# Patient Record
Sex: FEMALE | Race: WHITE | Employment: FULL TIME | URBAN - METROPOLITAN AREA
[De-identification: names, ages, dates, MRNs, and addresses within clinical notes are randomized per-mention and may not be internally consistent; named-entity substitution may affect disease eponyms.]

---

## 2020-02-05 ENCOUNTER — OFFICE VISIT (OUTPATIENT)
Dept: URGENT CARE | Facility: URGENT CARE | Age: 40
End: 2020-02-05
Payer: COMMERCIAL

## 2020-02-05 ENCOUNTER — ANCILLARY PROCEDURE (OUTPATIENT)
Dept: GENERAL RADIOLOGY | Facility: CLINIC | Age: 40
End: 2020-02-05
Attending: PHYSICIAN ASSISTANT
Payer: COMMERCIAL

## 2020-02-05 VITALS
SYSTOLIC BLOOD PRESSURE: 104 MMHG | WEIGHT: 138 LBS | HEART RATE: 63 BPM | DIASTOLIC BLOOD PRESSURE: 62 MMHG | OXYGEN SATURATION: 99 % | TEMPERATURE: 97.2 F

## 2020-02-05 DIAGNOSIS — M79.645 PAIN OF FINGER OF LEFT HAND: ICD-10-CM

## 2020-02-05 DIAGNOSIS — S60.10XA SUBUNGUAL HEMATOMA OF FINGER OF LEFT HAND, INITIAL ENCOUNTER: ICD-10-CM

## 2020-02-05 DIAGNOSIS — M79.645 PAIN OF FINGER OF LEFT HAND: Primary | ICD-10-CM

## 2020-02-05 PROCEDURE — 99204 OFFICE O/P NEW MOD 45 MIN: CPT | Performed by: PHYSICIAN ASSISTANT

## 2020-02-05 PROCEDURE — 73140 X-RAY EXAM OF FINGER(S): CPT | Mod: LT

## 2020-02-05 RX ORDER — LEVOTHYROXINE SODIUM 75 UG/1
75 TABLET ORAL
COMMUNITY
Start: 2018-12-11

## 2020-02-05 RX ORDER — HYDROCODONE BITARTRATE AND ACETAMINOPHEN 5; 325 MG/1; MG/1
1 TABLET ORAL EVERY 6 HOURS PRN
Qty: 10 TABLET | Refills: 0 | Status: SHIPPED | OUTPATIENT
Start: 2020-02-05 | End: 2020-02-08

## 2020-02-05 RX ORDER — SPIRONOLACTONE 100 MG/1
TABLET, FILM COATED ORAL
COMMUNITY
Start: 2019-12-09

## 2020-02-06 ENCOUNTER — TELEPHONE (OUTPATIENT)
Dept: URGENT CARE | Facility: URGENT CARE | Age: 40
End: 2020-02-06

## 2020-02-06 RX ORDER — HYDROCODONE BITARTRATE AND ACETAMINOPHEN 5; 325 MG/1; MG/1
1 TABLET ORAL EVERY 6 HOURS PRN
Qty: 6 TABLET | Refills: 0 | Status: SHIPPED | OUTPATIENT
Start: 2020-02-06 | End: 2020-02-09

## 2020-02-06 NOTE — PROGRESS NOTES
SUBJECTIVE:  Chief Complaint   Patient presents with     Urgent Care     Hand Injury     left hand middle finger door slam      Ana Walters is a 39 year old female presents with a chief complaint of left finger  third pain, swelling, tenderness and blood under nail.  The injury occurred 1 hour(s) ago.   The injury happened while at home. How: closed in door. The patient complained of moderate and severe pain  and has had decreased ROM.  Pain exacerbated by movement and pressure.  Relieved by rest, ice and elevation.  She treated it initially with ice, tylenol. This is the first time this type of injury has occurred to this patient.     No past medical history on file.  Current Outpatient Medications   Medication Sig Dispense Refill     HYDROcodone-acetaminophen (NORCO) 5-325 MG tablet Take 1 tablet by mouth every 6 hours as needed for pain 6 tablet 0     HYDROcodone-acetaminophen (NORCO) 5-325 MG tablet Take 1 tablet by mouth every 6 hours as needed for severe pain 10 tablet 0     levothyroxine (SYNTHROID/LEVOTHROID) 75 MCG tablet Take 75 mcg by mouth       sodium chloride (BONCHO SALINE) 0.9 % areosol solution Spray 1 Application in nostril       spironolactone (ALDACTONE) 100 MG tablet        Social History     Tobacco Use     Smoking status: Not on file   Substance Use Topics     Alcohol use: Not on file     Nonsmoker    No bone disease in self or family       Allergies   Allergen Reactions     Ketorolac Shortness Of Breath     Swollen tongue throat itchy     Nsaids Anaphylaxis     Sulfa Drugs Anaphylaxis     Sulfamethoxazole-Trimethoprim Other (See Comments)     Ibuprofen      Cat Hair Extract Other (See Comments)     congestion/infections     Dust Mites Other (See Comments)     congestion/infections     Pollen Extract Other (See Comments)     congestion/infections     Seasonal Other (See Comments)     congestion/infections         ROS:  10 point ROS negative except as listed above      EXAM:   /62 (BP  Location: Right arm)   Pulse 63   Temp 97.2  F (36.2  C) (Tympanic)   Wt 62.6 kg (138 lb)   SpO2 99%   Gen: healthy, alert and mild distress  EYES: conjunctiva clear  Extremity: finger  third has point tenderness at base of nail, proximal 20% of nail with subungual hematoma and pain with pressure and movment.   There is not compromise to the distal circulation.  Pulses are +2 and CRT is brisk  CHEST: no labored breathing  CV: cap refill intact  EXTREMITIES: peripheral pulses normal  SKIN: no suspicious lesions or rashes  NEURO: Normal strength and tone, sensory exam grossly normal, mentation intact and speech normal    X-ray image initially interpreted in clinic by me in order to rule out fracture/dislocation.  Cannot rule out the possiblilty of nondisplaced distal phalanx fracture.      ASSESSMENT:   (M79.645) Pain of finger of left hand  (primary encounter diagnosis)  (S60.10XA) Subungual hematoma of finger of left hand, initial encounter  Comment: hematoma too small to trephinate at this time.  Await rad read for possible fracture.    Plan: XR Finger Left G/E 2 Views,         HYDROcodone-acetaminophen (NORCO) 5-325 MG         tablet, HYDROcodone-acetaminophen (NORCO) 5-325        MG tablet  Splint, sling, follow up in 1 week    Red flags and emergent follow up discussed, and understood by patient  Follow up with PCP if symptoms worsen or fail to improve      Patient Instructions     Patient Education     Subungual Hematoma  A subungual hematoma is blood under the nail. It can occur when your finger or toe is hit or crushed. It causes the nail to look blue. In some cases, you may fracture the bone under the nail.   If the bruise is small and not too painful, it will heal without treatment. If the bruise is large and painful, you may need to have the blood drained.  If a large area of the nail is damaged, your doctor may want to remove it. If he or she does not remove the nail, it may become loose or fall off  in the next 2 weeks. In almost all cases, the nail will grow back from the area under the cuticle called the matrix. This takes a few weeks to start and is complete in about 4 to 6 months for a fingernail and 12 months for a toenail. If the nail bed or matrix was damaged, the nail may grow back with a rough or irregular shape. Sometimes the nail may not regrow at all.  Home care  The following guidelines will help you care for your wound at home:    Apply an ice pack (ice cubes in a plastic bag, wrapped in a thin towel) for no more than 20 minutes every 3 to 6 hours for the first 1 to 2 days. Continue this, as needed, 3 to 4 times a day until the pain and swelling goes away.    If the nail was drained:  ? Keep the nail covered with a clean adhesive bandage for the next 2 days. There may be some oozing of blood during that time, so change the bandage as needed.  ? Rinse the finger or toe once a day under warm running water. Clean any crust away with a cotton-tipped applicator soaked in soapy water.    If the nail was removed:  ? The nail bed (tissue under the nail) is moist, soft and sensitive. This needs to be protected from injury for the first 7 to 10 days until it dries out and becomes hard. Keep it covered with a dressing or adhesive bandage until that time.    Bandages tend to stick to a newly exposed nail bed and can be hard to remove if left in place more than 24 hours. Therefore, unless you were told otherwise, change dressings every 24 hours. Apply petroleum jelly and then a non-stick dressing. This will keep the bandage from sticking and make it easier to remove.  If necessary, soak the dressing off while holding your finger or toe under warm running water.    If an X-ray showed a fracture, protect the finger or toe for 3 to 4 weeks while it is healing.  Here is some information regarding medicine and your wound:    You can take over-the-counter pain medicine such as acetaminophen for pain, unless you were  given a different pain medicine to use. Talk with your healthcare provider before using this medicine if you have chronic liver or kidney disease. Also talk with your provider if you have had a stomach ulcer or digestive tract bleeding, or you are taking blood-thinner medicine.    If you were given antibiotics, take them until they are used up. It is important to finish the antibiotics even if the wound looks better. This will ensure the infection has cleared.  Follow-up care  Follow up with your doctor, or as advised. If the nail was drained, there is a small risk of infection. Watch carefully for the signs listed below.  When to seek medical advice  Call your doctor right away if any of these occur:    Increasing redness around the nail    Increasing local pain or swelling    Pus draining from the nail    Fever of 100.4 F (38 C) or higher, or as directed by your healthcare provider  Date Last Reviewed: 9/1/2016 2000-2019 [employer]. [address] [place] [address], [place] [address]. All rights reserved. This information is not intended as a substitute for professional medical care. Always follow your healthcare professional's instructions.

## 2020-02-06 NOTE — TELEPHONE ENCOUNTER
Called and left message for patient that medication for pain is ready to . Prescription placed in envelope and brought to the first floor  for patient to .    HUSSAIN Sung 9:18 AM 2/6/2020

## 2020-02-06 NOTE — PATIENT INSTRUCTIONS
Patient Education     Subungual Hematoma  A subungual hematoma is blood under the nail. It can occur when your finger or toe is hit or crushed. It causes the nail to look blue. In some cases, you may fracture the bone under the nail.   If the bruise is small and not too painful, it will heal without treatment. If the bruise is large and painful, you may need to have the blood drained.  If a large area of the nail is damaged, your doctor may want to remove it. If he or she does not remove the nail, it may become loose or fall off in the next 2 weeks. In almost all cases, the nail will grow back from the area under the cuticle called the matrix. This takes a few weeks to start and is complete in about 4 to 6 months for a fingernail and 12 months for a toenail. If the nail bed or matrix was damaged, the nail may grow back with a rough or irregular shape. Sometimes the nail may not regrow at all.  Home care  The following guidelines will help you care for your wound at home:    Apply an ice pack (ice cubes in a plastic bag, wrapped in a thin towel) for no more than 20 minutes every 3 to 6 hours for the first 1 to 2 days. Continue this, as needed, 3 to 4 times a day until the pain and swelling goes away.    If the nail was drained:  ? Keep the nail covered with a clean adhesive bandage for the next 2 days. There may be some oozing of blood during that time, so change the bandage as needed.  ? Rinse the finger or toe once a day under warm running water. Clean any crust away with a cotton-tipped applicator soaked in soapy water.    If the nail was removed:  ? The nail bed (tissue under the nail) is moist, soft and sensitive. This needs to be protected from injury for the first 7 to 10 days until it dries out and becomes hard. Keep it covered with a dressing or adhesive bandage until that time.    Bandages tend to stick to a newly exposed nail bed and can be hard to remove if left in place more than 24 hours. Therefore,  unless you were told otherwise, change dressings every 24 hours. Apply petroleum jelly and then a non-stick dressing. This will keep the bandage from sticking and make it easier to remove.  If necessary, soak the dressing off while holding your finger or toe under warm running water.    If an X-ray showed a fracture, protect the finger or toe for 3 to 4 weeks while it is healing.  Here is some information regarding medicine and your wound:    You can take over-the-counter pain medicine such as acetaminophen for pain, unless you were given a different pain medicine to use. Talk with your healthcare provider before using this medicine if you have chronic liver or kidney disease. Also talk with your provider if you have had a stomach ulcer or digestive tract bleeding, or you are taking blood-thinner medicine.    If you were given antibiotics, take them until they are used up. It is important to finish the antibiotics even if the wound looks better. This will ensure the infection has cleared.  Follow-up care  Follow up with your doctor, or as advised. If the nail was drained, there is a small risk of infection. Watch carefully for the signs listed below.  When to seek medical advice  Call your doctor right away if any of these occur:    Increasing redness around the nail    Increasing local pain or swelling    Pus draining from the nail    Fever of 100.4 F (38 C) or higher, or as directed by your healthcare provider  Date Last Reviewed: 9/1/2016 2000-2019 [employer]. [address] [place] [address], [place] [address]. All rights reserved. This information is not intended as a substitute for professional medical care. Always follow your healthcare professional's instructions.

## 2022-04-11 ENCOUNTER — LAB (OUTPATIENT)
Dept: LAB | Facility: CLINIC | Age: 42
End: 2022-04-11
Payer: COMMERCIAL

## 2022-04-11 DIAGNOSIS — E04.2 NONTOXIC MULTINODULAR GOITER: ICD-10-CM

## 2022-04-11 DIAGNOSIS — E89.0 POSTSURGICAL HYPOTHYROIDISM: Primary | ICD-10-CM

## 2022-04-11 LAB — HOLD SPECIMEN: NORMAL

## 2022-04-11 PROCEDURE — 36415 COLL VENOUS BLD VENIPUNCTURE: CPT

## 2022-04-11 PROCEDURE — 84439 ASSAY OF FREE THYROXINE: CPT

## 2022-04-11 PROCEDURE — 84443 ASSAY THYROID STIM HORMONE: CPT

## 2022-04-12 DIAGNOSIS — E89.0 POSTSURGICAL HYPOTHYROIDISM: Primary | ICD-10-CM

## 2022-04-12 DIAGNOSIS — E04.2 NONTOXIC MULTINODULAR GOITER: ICD-10-CM

## 2022-04-12 LAB
T4 FREE SERPL-MCNC: 1.01 NG/DL (ref 0.76–1.46)
TSH SERPL DL<=0.005 MIU/L-ACNC: 1 MU/L (ref 0.4–4)

## 2022-04-29 ENCOUNTER — LAB (OUTPATIENT)
Dept: LAB | Facility: CLINIC | Age: 42
End: 2022-04-29
Payer: COMMERCIAL

## 2022-04-29 DIAGNOSIS — L40.0 PSORIASIS VULGARIS: Primary | ICD-10-CM

## 2022-04-29 DIAGNOSIS — L40.0 PSORIASIS VULGARIS: ICD-10-CM

## 2022-04-29 LAB
BASOPHILS # BLD AUTO: 0.1 10E3/UL (ref 0–0.2)
BASOPHILS NFR BLD AUTO: 1 %
EOSINOPHIL # BLD AUTO: 0.3 10E3/UL (ref 0–0.7)
EOSINOPHIL NFR BLD AUTO: 5 %
ERYTHROCYTE [DISTWIDTH] IN BLOOD BY AUTOMATED COUNT: 11.8 % (ref 10–15)
HCT VFR BLD AUTO: 39.3 % (ref 35–47)
HGB BLD-MCNC: 12.6 G/DL (ref 11.7–15.7)
IMM GRANULOCYTES # BLD: 0 10E3/UL
IMM GRANULOCYTES NFR BLD: 0 %
LYMPHOCYTES # BLD AUTO: 2.4 10E3/UL (ref 0.8–5.3)
LYMPHOCYTES NFR BLD AUTO: 39 %
MCH RBC QN AUTO: 29.9 PG (ref 26.5–33)
MCHC RBC AUTO-ENTMCNC: 32.1 G/DL (ref 31.5–36.5)
MCV RBC AUTO: 93 FL (ref 78–100)
MONOCYTES # BLD AUTO: 0.5 10E3/UL (ref 0–1.3)
MONOCYTES NFR BLD AUTO: 9 %
NEUTROPHILS # BLD AUTO: 2.8 10E3/UL (ref 1.6–8.3)
NEUTROPHILS NFR BLD AUTO: 46 %
PLATELET # BLD AUTO: 376 10E3/UL (ref 150–450)
RBC # BLD AUTO: 4.21 10E6/UL (ref 3.8–5.2)
WBC # BLD AUTO: 6 10E3/UL (ref 4–11)

## 2022-04-29 PROCEDURE — 86481 TB AG RESPONSE T-CELL SUSP: CPT

## 2022-04-29 PROCEDURE — 36415 COLL VENOUS BLD VENIPUNCTURE: CPT

## 2022-04-29 PROCEDURE — 85025 COMPLETE CBC W/AUTO DIFF WBC: CPT

## 2022-05-01 LAB
GAMMA INTERFERON BACKGROUND BLD IA-ACNC: 0.08 IU/ML
M TB IFN-G BLD-IMP: NEGATIVE
M TB IFN-G CD4+ BCKGRND COR BLD-ACNC: 9.92 IU/ML
MITOGEN IGNF BCKGRD COR BLD-ACNC: 0.03 IU/ML
MITOGEN IGNF BCKGRD COR BLD-ACNC: 0.03 IU/ML
QUANTIFERON MITOGEN: 10 IU/ML
QUANTIFERON NIL TUBE: 0.08 IU/ML
QUANTIFERON TB1 TUBE: 0.11 IU/ML
QUANTIFERON TB2 TUBE: 0.11

## 2022-05-06 DIAGNOSIS — L40.0 PSORIASIS VULGARIS: Primary | ICD-10-CM

## 2022-05-22 ENCOUNTER — HEALTH MAINTENANCE LETTER (OUTPATIENT)
Age: 42
End: 2022-05-22

## 2022-09-18 ENCOUNTER — HEALTH MAINTENANCE LETTER (OUTPATIENT)
Age: 42
End: 2022-09-18

## 2023-06-04 ENCOUNTER — HEALTH MAINTENANCE LETTER (OUTPATIENT)
Age: 43
End: 2023-06-04

## 2024-02-25 ENCOUNTER — HEALTH MAINTENANCE LETTER (OUTPATIENT)
Age: 44
End: 2024-02-25

## 2024-07-14 ENCOUNTER — HEALTH MAINTENANCE LETTER (OUTPATIENT)
Age: 44
End: 2024-07-14

## 2025-08-09 ENCOUNTER — HEALTH MAINTENANCE LETTER (OUTPATIENT)
Age: 45
End: 2025-08-09

## 2025-09-02 ENCOUNTER — OFFICE VISIT (OUTPATIENT)
Dept: PLASTIC SURGERY | Facility: CLINIC | Age: 45
End: 2025-09-02
Payer: COMMERCIAL

## 2025-09-02 DIAGNOSIS — S01.511D: Primary | ICD-10-CM
